# Patient Record
Sex: FEMALE | URBAN - METROPOLITAN AREA
[De-identification: names, ages, dates, MRNs, and addresses within clinical notes are randomized per-mention and may not be internally consistent; named-entity substitution may affect disease eponyms.]

---

## 2017-06-29 ENCOUNTER — APPOINTMENT (OUTPATIENT)
Age: 52
Setting detail: DERMATOLOGY
End: 2017-06-29

## 2017-06-29 VITALS — HEIGHT: 65 IN | WEIGHT: 205 LBS

## 2017-06-29 DIAGNOSIS — L65.9 NONSCARRING HAIR LOSS, UNSPECIFIED: ICD-10-CM

## 2017-06-29 DIAGNOSIS — L663 OTHER SPECIFIED DISEASES OF HAIR AND HAIR FOLLICLES: ICD-10-CM

## 2017-06-29 DIAGNOSIS — L738 OTHER SPECIFIED DISEASES OF HAIR AND HAIR FOLLICLES: ICD-10-CM

## 2017-06-29 DIAGNOSIS — L20.89 OTHER ATOPIC DERMATITIS: ICD-10-CM

## 2017-06-29 DIAGNOSIS — L30.9 DERMATITIS, UNSPECIFIED: ICD-10-CM

## 2017-06-29 PROBLEM — L29.8 OTHER PRURITUS: Status: ACTIVE | Noted: 2017-06-29

## 2017-06-29 PROBLEM — J45.909 UNSPECIFIED ASTHMA, UNCOMPLICATED: Status: ACTIVE | Noted: 2017-06-29

## 2017-06-29 PROBLEM — L20.84 INTRINSIC (ALLERGIC) ECZEMA: Status: ACTIVE | Noted: 2017-06-29

## 2017-06-29 PROBLEM — J30.1 ALLERGIC RHINITIS DUE TO POLLEN: Status: ACTIVE | Noted: 2017-06-29

## 2017-06-29 PROBLEM — E13.9 OTHER SPECIFIED DIABETES MELLITUS WITHOUT COMPLICATIONS: Status: ACTIVE | Noted: 2017-06-29

## 2017-06-29 PROBLEM — L02.221 FURUNCLE OF ABDOMINAL WALL: Status: ACTIVE | Noted: 2017-06-29

## 2017-06-29 PROBLEM — L70.0 ACNE VULGARIS: Status: ACTIVE | Noted: 2017-06-29

## 2017-06-29 PROBLEM — L85.3 XEROSIS CUTIS: Status: ACTIVE | Noted: 2017-06-29

## 2017-06-29 PROCEDURE — OTHER TREATMENT REGIMEN: OTHER

## 2017-06-29 PROCEDURE — OTHER PRESCRIPTION: OTHER

## 2017-06-29 PROCEDURE — OTHER FOLLOW UP FOR NEXT VISIT: OTHER

## 2017-06-29 PROCEDURE — 99203 OFFICE O/P NEW LOW 30 MIN: CPT | Mod: 25

## 2017-06-29 PROCEDURE — 11100: CPT

## 2017-06-29 PROCEDURE — OTHER BIOPSY BY SHAVE METHOD: OTHER

## 2017-06-29 RX ORDER — MOMETASONE FUROATE 1 MG/G
THIN COAT OINTMENT TOPICAL BID
Qty: 1 | Refills: 3 | Status: ERX | COMMUNITY
Start: 2017-06-29

## 2017-06-29 ASSESSMENT — LOCATION SIMPLE DESCRIPTION DERM
LOCATION SIMPLE: RIGHT CHEEK
LOCATION SIMPLE: LEFT UPPER ARM
LOCATION SIMPLE: ABDOMEN
LOCATION SIMPLE: CHEST
LOCATION SIMPLE: LEFT CHEEK
LOCATION SIMPLE: RIGHT UPPER ARM

## 2017-06-29 ASSESSMENT — LOCATION ZONE DERM
LOCATION ZONE: FACE
LOCATION ZONE: ARM
LOCATION ZONE: TRUNK

## 2017-06-29 ASSESSMENT — SEVERITY ASSESSMENT
SEVERITY: CLEAR
SEVERITY: ALMOST CLEAR
SEVERITY: MILD TO MODERATE

## 2017-06-29 ASSESSMENT — LOCATION DETAILED DESCRIPTION DERM
LOCATION DETAILED: RIGHT RIB CAGE
LOCATION DETAILED: LEFT RIB CAGE
LOCATION DETAILED: LEFT ANTECUBITAL SKIN
LOCATION DETAILED: RIGHT INFERIOR LATERAL MALAR CHEEK
LOCATION DETAILED: LEFT SUPERIOR LATERAL BUCCAL CHEEK
LOCATION DETAILED: LEFT LATERAL SUPERIOR CHEST
LOCATION DETAILED: RIGHT ANTECUBITAL SKIN

## 2017-06-29 ASSESSMENT — PAIN INTENSITY VAS: HOW INTENSE IS YOUR PAIN 0 BEING NO PAIN, 10 BEING THE MOST SEVERE PAIN POSSIBLE?: NO PAIN

## 2017-06-29 ASSESSMENT — BSA RASH: BSA RASH: 0

## 2017-06-29 NOTE — PROCEDURE: BIOPSY BY SHAVE METHOD
Wound Care: Polysporin ointment
Bill 67828 For Specimen Handling/Conveyance To Laboratory?: no
Billing Type: Third-Party Bill
Post-Care Instructions: I reviewed with the patient in detail post-care instructions. Patient is to keep the biopsy site dry overnight, and then apply polysporin twice daily until healed. Patient may apply hydrogen peroxide soaks to remove any crusting. Call if any serious redness, swelling or increasing pain
X Size Of Lesion In Cm: 0
Additional Anesthesia Volume In Cc (Will Not Render If 0): 0.5
Cryotherapy Text: The wound bed was treated with cryotherapy after the biopsy was performed.
Anesthesia Type: 1% Xylocaine with epinephrine
Detail Level: Detailed
Render Post-Care Instructions In Note?: yes
Anesthesia Volume In Cc: 0.4
Biopsy Type: H and E
Dressing: bandage
Notification Instructions: Patient will be notified of biopsy results. However, patient instructed to call the office if not contacted within 1 week.
Electrodesiccation Text: The wound bed was treated with electrodesiccation after the biopsy was performed.
Consent: Written consent was obtained and risks were reviewed including but not limited to scarring, infection, bleeding, scabbing, incomplete removal, nerve damage and allergy to anesthesia.
Curettage Text: The wound bed was treated with curettage after the biopsy was performed using #15 blade
Silver Nitrate Text: The wound bed was treated with silver nitrate after the biopsy was performed.
Biopsy Method: Dermablade
Hemostasis: Electrocautery
Type Of Destruction Used: Electrodesiccation and Curettage
Size Of Lesion In Cm: 0.3
Path Notes (To The Dermatopathologist): Possible granuloma annulare versus tinea corporis, please evaluate
Electrodesiccation And Curettage Text: The wound bed was treated with electrodesiccation and curettage after the biopsy was performed.

## 2017-06-29 NOTE — PROCEDURE: FOLLOW UP FOR NEXT VISIT
Instructions (Optional): I would recommend the patient follow up when and if she has a flareup so that I can evaluate the process to determine what treatment options might be best
Detail Level: Detailed
Scheduled For Follow Up In (Optional): 4-6 weeks

## 2017-06-29 NOTE — HPI: RASH
How Severe Is Your Rash?: severe
Is This A New Presentation, Or A Follow-Up?: Rash
Additional History: Patient has had an in large annular rash on the left of her breast for the last four months. Red, slightly bumpy, scaling and itching. She has been using the combination clotrimazole betamethasone for the last three months with some slight benefit in the itching but overall no improvement in the actual rash. She has never had a rash like this before, no other rash elsewhere on the body

## 2017-06-29 NOTE — PROCEDURE: TREATMENT REGIMEN
Detail Level: Simple
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 Patient can use the [facility] appointment twice daily for 2 to 4 weeks if there is any recurrence but ultimately I would recommend she follow up when this occurs for me to be able to evaluate the process
Samples Given: Eucrisa
Plan: Currently the patient has no active inflammation however she describes a history of one or two times per year having red, tender boils under the breasts. She uses no current topical treatment. I did recommend she start with an over-the-counter 5% benzoyl peroxide wash 1 to 2 times weekly and use of dial soap. Patient will follow up if she has a flareup for an evaluation
Plan: At this point is difficult to say is this represents a fungal infection or possibly granuloma annulare. Hopefully the biopsy will help distinguish the actual cause so that we can send in a prescription for treatment. Patient is willing to wait until we get the biopsy results for further treatment recommendation. For now it is OK to use the clotrimazole betamethasone combination simply for itching
Otc Regimen: Aveeno wash, Aveeno lotion
Plan: Currently the skin is clear, no dryness, no inflammation, no excoriation. The patient uses Elocon ointment every 3 to 5 days for a few days as needed. This has helped control the problem so far. She is doing better about mild soap and good lotion as well. I do think it worthwhile to try the samples of the Eucrisa to see if we can reduce her overall dependence on the topical steroid in particular on the face. The patient may need to follow up when there is a flareup for us to evaluate whether different treatment options might be better

## 2017-07-13 ENCOUNTER — RX ONLY (RX ONLY)
Age: 52
End: 2017-07-13

## 2017-07-13 RX ORDER — ECONAZOLE NITRATE 10 MG/G
THIN COAT CREAM TOPICAL BID
Qty: 1 | Refills: 1 | Status: ERX | COMMUNITY
Start: 2017-07-13